# Patient Record
Sex: FEMALE | Race: BLACK OR AFRICAN AMERICAN | NOT HISPANIC OR LATINO | Employment: STUDENT | URBAN - METROPOLITAN AREA
[De-identification: names, ages, dates, MRNs, and addresses within clinical notes are randomized per-mention and may not be internally consistent; named-entity substitution may affect disease eponyms.]

---

## 2020-11-10 LAB
B-HCG UR QL: NEGATIVE
CTP QC/QA: YES

## 2020-11-10 PROCEDURE — 99284 EMERGENCY DEPT VISIT MOD MDM: CPT

## 2020-11-10 PROCEDURE — 81025 URINE PREGNANCY TEST: CPT | Performed by: NURSE PRACTITIONER

## 2020-11-10 RX ORDER — LIDOCAINE HYDROCHLORIDE 20 MG/ML
5 INJECTION, SOLUTION INFILTRATION; PERINEURAL
Status: DISCONTINUED | OUTPATIENT
Start: 2020-11-10 | End: 2020-11-11 | Stop reason: HOSPADM

## 2020-11-11 ENCOUNTER — HOSPITAL ENCOUNTER (EMERGENCY)
Facility: HOSPITAL | Age: 25
Discharge: HOME | End: 2020-11-11
Attending: EMERGENCY MEDICINE
Payer: MEDICAID

## 2020-11-11 VITALS
DIASTOLIC BLOOD PRESSURE: 62 MMHG | BODY MASS INDEX: 27.46 KG/M2 | TEMPERATURE: 99 F | RESPIRATION RATE: 16 BRPM | SYSTOLIC BLOOD PRESSURE: 104 MMHG | HEART RATE: 84 BPM | OXYGEN SATURATION: 98 % | HEIGHT: 63 IN | WEIGHT: 155 LBS

## 2020-11-11 DIAGNOSIS — N89.8 VAGINAL CYST: Primary | ICD-10-CM

## 2020-11-11 RX ORDER — NAPROXEN 500 MG/1
500 TABLET ORAL 2 TIMES DAILY WITH MEALS
Qty: 14 TABLET | Refills: 0 | Status: SHIPPED | OUTPATIENT
Start: 2020-11-11 | End: 2020-11-18

## 2020-11-11 NOTE — DISCHARGE INSTRUCTIONS
Please return to the nearest emergency department should you experience severe pelvic or abdominal pain, fever, nausea or vomiting, constipation, vaginal bleeding or any other concerning symptoms

## 2020-11-11 NOTE — ED NOTES
"Pt c/o vaginal pain since earlier today while trying to insert a vaginal suppository for a UTI. Pt states, "I can feel something down there". Pt is A & O x 3, denies SOB, fever, chills and N/V/D. Skin is warm, dry and pink. KELLY x 3mm. BBS- CTA. Abd- SNT. PSM x 4 exts. Will continue to monitor closely.  "

## 2020-11-11 NOTE — ED PROVIDER NOTES
"Encounter Date: 11/10/2020    SCRIBE #1 NOTE: I, Rizwana Sydney, am scribing for, and in the presence of,  Marty Palmer MD. I have scribed the entire note.       History     Chief Complaint   Patient presents with    Vaginal Pain     pt states something bulging out her vagina onset today, denies vag bleeding or discharge. also knot to right groin     This is a 26 y/o female with no pertinent PMHx, presenting to the ED with a CC of vaginal pain that began yesterday. Pain is located between right pelvis and thigh. Patient also reports feeling a "bulge" within her vaginal canal. Associated symptoms include random bleeding of 1-2 days, lower back pain, and problems with urination. Patient has not experienced these symptoms before. Pain at its worst was 7/10, but now is 2/10. She can feel pain and discomfort when walking or using the bathroom. She denies any fever, chills, or other symptoms. Patient mentions using a UTI tablet that is inserted into the vagina. No known history of Anemia. No PSHx. Patient has never given birth. No history of gynecology or urology procedures. Last pap smear was 1-2 years ago, and resulted normally. Patient does not have a regular gynecologist. NDKA    The history is provided by the patient.     Review of patient's allergies indicates:  No Known Allergies  No past medical history on file.  No past surgical history on file.  No family history on file.  Social History     Tobacco Use    Smoking status: Never Smoker   Substance Use Topics    Alcohol use: No    Drug use: No     Review of Systems   Constitutional: Negative for chills, diaphoresis, fatigue and fever.   HENT: Negative for congestion, sinus pain and sore throat.    Eyes: Negative for pain, discharge, redness and itching.   Respiratory: Negative for cough, shortness of breath and wheezing.    Cardiovascular: Negative for chest pain.   Gastrointestinal: Negative for abdominal pain, diarrhea, nausea and vomiting. "   Genitourinary: Positive for difficulty urinating, pelvic pain, vaginal bleeding and vaginal pain. Negative for decreased urine volume, dysuria, flank pain, frequency, genital sores, hematuria, menstrual problem, urgency and vaginal discharge.        Positive for bulge in vaginal canal   Musculoskeletal: Positive for back pain. Negative for myalgias and neck pain.   Skin: Negative for rash and wound.   Neurological: Negative for dizziness, syncope and headaches.   Psychiatric/Behavioral: Negative for confusion.       Physical Exam     Initial Vitals [11/10/20 2319]   BP Pulse Resp Temp SpO2   (!) 118/58 99 20 99.2 °F (37.3 °C) 99 %      MAP       --         Physical Exam    Nursing note and vitals reviewed.  Constitutional: Vital signs are normal. She appears well-developed and well-nourished. She is not diaphoretic. No distress.   HENT:   Head: Normocephalic and atraumatic.   Eyes: Conjunctivae are normal. Right eye exhibits no discharge. Left eye exhibits no discharge. No scleral icterus.   Neck: Normal range of motion. Neck supple. No tracheal deviation present. No JVD present.   Cardiovascular: Normal rate, regular rhythm, intact distal pulses and normal pulses. Exam reveals no gallop and no friction rub.    No murmur heard.  Pulses:       Radial pulses are 2+ on the right side and 2+ on the left side.   Pulmonary/Chest: No stridor. No respiratory distress.   Abdominal: Soft. Bowel sounds are normal. She exhibits no distension and no mass. There is no abdominal tenderness. There is no rebound and no guarding.   Genitourinary:    Genitourinary Comments: Female chaperone present for speculum exam.  Clear cyst approximately 0.5 cm in diameter noted to the 9 o'clock position of the vaginal canal.   No vaginal bleeding or discharge present  External genitalia exam was unremarkable      Musculoskeletal: Normal range of motion. No tenderness or edema.   Neurological: She is alert and oriented to person, place, and  time. She has normal strength. GCS score is 15. GCS eye subscore is 4. GCS verbal subscore is 5. GCS motor subscore is 6.   TRINO with NGND's   Skin: Skin is warm and dry. Capillary refill takes less than 2 seconds. No rash and no abscess noted. No erythema. No pallor.   Psychiatric: She has a normal mood and affect. Her behavior is normal. Judgment and thought content normal.         ED Course   Procedures  Labs Reviewed   POCT URINE PREGNANCY          Imaging Results    None          Medical Decision Making:   Initial Assessment:   This is a 24 y/o female with no pertinent PMHx, presenting to the ED with a CC of vaginal pain that began yesterday. Labs will be ordered, and pelvic exam will be performed. Patient will be discharged and instructed to contact OB/GYN to follow up.  Clinical Tests:   Lab Tests: Ordered and Reviewed            Scribe Attestation:   Scribe #1: I performed the above scribed service and the documentation accurately describes the services I performed. I attest to the accuracy of the note.      Pt arrived alert, afebrile, non-toxic in appearance, in no acute respiratory distress with VSS.  Pt's pain localized to a cyst within the vaginal vault.  Pt discharged and counseled on the need to return to the nearest emergency room if they experience any other concerning symptoms.  Pt counseled to F/U with a gynecologist outpatient over the next week.    Marty Palmer MD                          Clinical Impression:       ICD-10-CM ICD-9-CM   1. Vaginal cyst  N89.8 623.8                      Disposition:   Disposition: Discharged  Condition: Stable     ED Disposition Condition    Discharge Stable        ED Prescriptions     Medication Sig Dispense Start Date End Date Auth. Provider    naproxen (NAPROSYN) 500 MG tablet Take 1 tablet (500 mg total) by mouth 2 (two) times daily with meals. for 7 days 14 tablet 11/11/2020 11/18/2020 Marty Palmer MD        Follow-up Information     Follow up  With Specialties Details Why Contact Info    [facility]  Schedule an appointment as soon as possible for a visit in 1 week to follow-up with a gynecologist as well as a primary care physician [address]  [place] [address]  [phone]      [facility] Emergency [facility] Go to  As needed, If symptoms worsen [address] [place] [address] [phone]                     I, Marty Palmer, personally performed the services described in this documentation. All medical record entries made by the scribe were at my direction and in my presence.  I have reviewed the chart and agree that the record reflects my personal performance and is accurate and complete.    Marty Palmer MD  11/16/20 3921

## 2020-11-11 NOTE — FIRST PROVIDER EVALUATION
" Emergency Department TeleTriage Encounter Note      CHIEF COMPLAINT    Chief Complaint   Patient presents with    Vaginal Pain     pt states something bulging out her vagina onset today, denies vag bleeding or discharge. also knot to right groin       VITAL SIGNS   Initial Vitals [11/10/20 2319]   BP Pulse Resp Temp SpO2   (!) 118/58 99 20 99.2 °F (37.3 °C) 99 %      MAP       --            ALLERGIES    Review of patient's allergies indicates:  No Known Allergies    PROVIDER TRIAGE NOTE  This is a teletriage evaluation of a 25 y.o. female presenting to the ED with c/o "bulge" from vagina x 1 day.  (+) pain, (-) new sexual contacts. Initial orders will be placed and care will be transferred to an alternate provider when patient is roomed for a full evaluation. Any additional orders and the final disposition will be determined by that provider.       ORDERS  Labs Reviewed   POCT URINE PREGNANCY       ED Orders (720h ago, onward)    Start Ordered     Status Ordering Provider    11/10/20 2330 11/10/20 2328  lidocaine HCL 20 mg/ml (2%) injection 5 mL  ED 1 Time      Ordered YOVANY DERAS    11/10/20 2328 11/10/20 2328  POCT urine pregnancy  Once      Ordered YOVANY DERAS    11/10/20 2328 11/10/20 2328  Setup Pelvic Tray  Once      Ordered YOVANY DERAS    11/10/20 2328 11/10/20 2328  ED I&D Kit  Once      Ordered YOVANY DERAS            Virtual Visit Note: The provider triage portion of this emergency department evaluation and documentation was performed via [email or web address], a HIPAA-compliant telemedicine application, in concert with a tele-presenter in the room. A face to face patient evaluation with one of my colleagues will occur once the patient is placed in an emergency department room.      DISCLAIMER: This note was prepared with [email or web address] voice recognition transcription software. Garbled syntax, mangled pronouns, and other bizarre constructions may be attributed to that software system.  "

## 2021-04-16 ENCOUNTER — PATIENT MESSAGE (OUTPATIENT)
Dept: RESEARCH | Facility: HOSPITAL | Age: 26
End: 2021-04-16

## 2021-10-27 DIAGNOSIS — N92.0 EXCESSIVE AND FREQUENT MENSTRUATION WITH REGULAR CYCLE: Primary | ICD-10-CM

## 2021-11-02 ENCOUNTER — HOSPITAL ENCOUNTER (OUTPATIENT)
Dept: RADIOLOGY | Facility: HOSPITAL | Age: 26
Discharge: HOME | End: 2021-11-02
Attending: CLINIC/CENTER
Payer: MEDICAID

## 2021-11-02 DIAGNOSIS — N92.0 EXCESSIVE AND FREQUENT MENSTRUATION WITH REGULAR CYCLE: ICD-10-CM

## 2021-11-02 PROCEDURE — 76856 US EXAM PELVIC COMPLETE: CPT | Mod: TC

## 2021-11-02 PROCEDURE — 76830 TRANSVAGINAL US NON-OB: CPT | Mod: 26,,, | Performed by: RADIOLOGY

## 2021-11-02 PROCEDURE — 76856 US PELVIS COMP WITH TRANSVAG NON-OB (XPD): ICD-10-PCS | Mod: 26,,, | Performed by: RADIOLOGY

## 2021-11-02 PROCEDURE — 76856 US EXAM PELVIC COMPLETE: CPT | Mod: 26,,, | Performed by: RADIOLOGY

## 2021-11-02 PROCEDURE — 76830 US PELVIS COMP WITH TRANSVAG NON-OB (XPD): ICD-10-PCS | Mod: 26,,, | Performed by: RADIOLOGY
